# Patient Record
Sex: FEMALE | Race: WHITE | NOT HISPANIC OR LATINO | URBAN - NONMETROPOLITAN AREA
[De-identification: names, ages, dates, MRNs, and addresses within clinical notes are randomized per-mention and may not be internally consistent; named-entity substitution may affect disease eponyms.]

---

## 2020-12-16 ENCOUNTER — IMPORTED ENCOUNTER (OUTPATIENT)
Dept: URBAN - NONMETROPOLITAN AREA CLINIC 1 | Facility: CLINIC | Age: 55
End: 2020-12-16

## 2020-12-16 PROCEDURE — 92014 COMPRE OPH EXAM EST PT 1/>: CPT

## 2020-12-16 PROCEDURE — 92015 DETERMINE REFRACTIVE STATE: CPT

## 2020-12-16 NOTE — PATIENT DISCUSSION
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

## 2020-12-21 PROBLEM — H18.832: Noted: 2020-12-21

## 2020-12-21 PROBLEM — H52.4: Noted: 2020-12-16

## 2020-12-21 PROBLEM — H52.222: Noted: 2020-12-16

## 2020-12-21 PROBLEM — H52.03: Noted: 2020-12-16

## 2021-01-11 ENCOUNTER — IMPORTED ENCOUNTER (OUTPATIENT)
Dept: URBAN - NONMETROPOLITAN AREA CLINIC 1 | Facility: CLINIC | Age: 56
End: 2021-01-11

## 2021-01-11 PROBLEM — H52.4: Noted: 2021-01-11

## 2021-01-11 PROBLEM — H52.03: Noted: 2021-01-11

## 2021-01-11 PROBLEM — H52.222: Noted: 2021-01-11

## 2021-01-11 PROBLEM — H18.832: Noted: 2021-01-11

## 2021-01-11 PROCEDURE — 99213 OFFICE O/P EST LOW 20 MIN: CPT

## 2021-01-11 NOTE — PATIENT DISCUSSION
RCE OS-  discussed findings w/patient-  start Doxycycline 50mg BID PO x 3 mo-  start Lotemax TID OS-  RTC 2 weeks f/u; 's Notes: MR 12/16/2020DFE 12/16/2020

## 2021-01-26 ENCOUNTER — IMPORTED ENCOUNTER (OUTPATIENT)
Dept: URBAN - NONMETROPOLITAN AREA CLINIC 1 | Facility: CLINIC | Age: 56
End: 2021-01-26

## 2021-01-26 PROCEDURE — 99213 OFFICE O/P EST LOW 20 MIN: CPT

## 2021-01-26 NOTE — PATIENT DISCUSSION
RCE OS-  discussed findings w/patient-  continue Doxycycline 50mg BID PO x 3 mo-  taper Lotemax BID OS-  start Refresh Relieva at least BID OS-  RTC 1 mo f/u; 's Notes: MR 12/16/2020<br />DFE 12/16/2020<br />

## 2021-02-10 ENCOUNTER — IMPORTED ENCOUNTER (OUTPATIENT)
Dept: URBAN - NONMETROPOLITAN AREA CLINIC 1 | Facility: CLINIC | Age: 56
End: 2021-02-10

## 2021-02-10 PROBLEM — H52.222: Noted: 2021-02-10

## 2021-02-10 PROBLEM — H52.03: Noted: 2021-02-10

## 2021-02-10 PROBLEM — H16.223: Noted: 2021-02-10

## 2021-02-10 PROBLEM — H18.832: Noted: 2021-02-10

## 2021-02-10 PROBLEM — H52.4: Noted: 2021-02-10

## 2021-02-10 PROCEDURE — 92071 CONTACT LENS FITTING FOR TX: CPT

## 2021-02-10 PROCEDURE — 99213 OFFICE O/P EST LOW 20 MIN: CPT

## 2021-02-17 ENCOUNTER — IMPORTED ENCOUNTER (OUTPATIENT)
Dept: URBAN - NONMETROPOLITAN AREA CLINIC 1 | Facility: CLINIC | Age: 56
End: 2021-02-17

## 2021-02-17 PROCEDURE — 99213 OFFICE O/P EST LOW 20 MIN: CPT

## 2021-02-17 NOTE — PATIENT DISCUSSION
RCE OS-  discussed findings w/patient-  continue Doxycycline 50mg BID PO x 3 mo-  d/c Lotemax-  continue Refresh at least QID OU patient will  coupon at [facility] location 2/18/2021-  BCL removed today at [facility] ([facility] [place] [facility]) well tolerated-  RTC as scheduled or prnDES OU-  discussed findings w/patient-  doing much better today w/Refresh Best 3-  may have to try Restasis/Xiidra for long term -  continue to monitor as scheduled or prn; 's Notes: MR 12/16/2020DFE 12/16/2020

## 2022-04-10 ASSESSMENT — VISUAL ACUITY
OU_CC: 20/30
OU_CC: 20/20
OS_CC: 20/40-
OU_SC: J1+
OD_CC: 20/25+2
OS_CC: 20/50+2
OD_CC: 20/20
OS_CC: 20/50+2
OS_CC: 20/30-
OS_PH: 20/25
OD_CC: 20/20
OU_SC: 20/70-
OD_CC: 20/20
OD_CC: 20/20-
OS_CC: 20/25-1

## 2022-04-10 ASSESSMENT — TONOMETRY
OD_IOP_MMHG: 18
OS_IOP_MMHG: 17
OS_IOP_MMHG: 18
OD_IOP_MMHG: 18
OD_IOP_MMHG: 19
OS_IOP_MMHG: 19
OS_IOP_MMHG: 21
OD_IOP_MMHG: 19

## 2023-05-17 NOTE — PATIENT DISCUSSION
RCE OS-  discussed findings w/patient-  continue Doxycycline 50mg BID PO x 3 mo-  d/c Lotemax-  continue Refresh at least QID OU-  BCL placed today-  RTC 1 week f/u or prnDES OU-  discussed findings w/patient-  may have to try Restasis/Xiidra for long term -  continue to monitor 1 week or prn; 's Notes: MR 12/16/2020DFE 12/16/2020 Closure 3 Information: This tab is for additional flaps and grafts above and beyond our usual structured repairs.  Please note if you enter information here it will not currently bill and you will need to add the billing information manually.